# Patient Record
Sex: MALE | Race: ASIAN | NOT HISPANIC OR LATINO
[De-identification: names, ages, dates, MRNs, and addresses within clinical notes are randomized per-mention and may not be internally consistent; named-entity substitution may affect disease eponyms.]

---

## 2022-01-17 ENCOUNTER — RESULT REVIEW (OUTPATIENT)
Age: 27
End: 2022-01-17

## 2022-06-05 ENCOUNTER — EMERGENCY (EMERGENCY)
Facility: HOSPITAL | Age: 27
LOS: 1 days | Discharge: HOME | End: 2022-06-05
Attending: STUDENT IN AN ORGANIZED HEALTH CARE EDUCATION/TRAINING PROGRAM
Payer: COMMERCIAL

## 2022-06-05 VITALS
TEMPERATURE: 98 F | DIASTOLIC BLOOD PRESSURE: 106 MMHG | RESPIRATION RATE: 22 BRPM | WEIGHT: 164.02 LBS | HEIGHT: 70 IN | OXYGEN SATURATION: 100 % | HEART RATE: 59 BPM | SYSTOLIC BLOOD PRESSURE: 162 MMHG

## 2022-06-05 VITALS
HEART RATE: 57 BPM | OXYGEN SATURATION: 100 % | RESPIRATION RATE: 18 BRPM | SYSTOLIC BLOOD PRESSURE: 125 MMHG | TEMPERATURE: 98 F | DIASTOLIC BLOOD PRESSURE: 86 MMHG

## 2022-06-05 DIAGNOSIS — T14.8 OTHER INJURY OF UNSPECIFIED BODY REGION: Chronic | ICD-10-CM

## 2022-06-05 PROCEDURE — 96375 TX/PRO/DX INJ NEW DRUG ADDON: CPT

## 2022-06-05 PROCEDURE — 73020 X-RAY EXAM OF SHOULDER: CPT

## 2022-06-05 PROCEDURE — 99284 EMERGENCY DEPT VISIT MOD MDM: CPT | Mod: 25

## 2022-06-05 PROCEDURE — 73030 X-RAY EXAM OF SHOULDER: CPT

## 2022-06-05 PROCEDURE — 99284 EMERGENCY DEPT VISIT MOD MDM: CPT | Mod: 57

## 2022-06-05 PROCEDURE — 23650 CLTX SHO DSLC W/MNPJ WO ANES: CPT | Mod: 54

## 2022-06-05 PROCEDURE — 73030 X-RAY EXAM OF SHOULDER: CPT | Mod: 26,RT

## 2022-06-05 PROCEDURE — 96374 THER/PROPH/DIAG INJ IV PUSH: CPT

## 2022-06-05 PROCEDURE — 23650 CLTX SHO DSLC W/MNPJ WO ANES: CPT | Mod: RT

## 2022-06-05 RX ORDER — KETOROLAC TROMETHAMINE 30 MG/ML
30 SYRINGE (ML) INJECTION ONCE
Refills: 0 | Status: DISCONTINUED | OUTPATIENT
Start: 2022-06-05 | End: 2022-06-05

## 2022-06-05 RX ORDER — MORPHINE SULFATE 50 MG/1
4 CAPSULE, EXTENDED RELEASE ORAL ONCE
Refills: 0 | Status: DISCONTINUED | OUTPATIENT
Start: 2022-06-05 | End: 2022-06-05

## 2022-06-05 RX ADMIN — Medication 30 MILLIGRAM(S): at 18:17

## 2022-06-05 RX ADMIN — MORPHINE SULFATE 4 MILLIGRAM(S): 50 CAPSULE, EXTENDED RELEASE ORAL at 18:19

## 2022-06-05 NOTE — ED PROVIDER NOTE - ATTENDING APP SHARED VISIT CONTRIBUTION OF CARE
27 M w/ no pmh here w/ R shoulder pain s/p lifting weights felt a pop, no fall no injury, pt w/ no numbness in the arm, no cp, no sob. no hand/wrist weakness. pt s/p reduction w/ no numbness along medial/radial/ulnar/axillary nerve, intact hand/wrist/elbow flexion/extension, pt w/ no tenderness in the shoulder. pt R hand dominant. Recommending outpt sports mediicne follow up nonweightbearing int eh Right upper extremity 2+ radial pulse bialterally,

## 2022-06-05 NOTE — ED PROVIDER NOTE - PATIENT PORTAL LINK FT
You can access the FollowMyHealth Patient Portal offered by [facility] by registering at the following website: [email or web address]. By joining [facility]’s FollowMyHealth portal, you will also be able to view your health information using other applications (apps) compatible with our system.

## 2022-06-05 NOTE — ED PROVIDER NOTE - NSFOLLOWUPINSTRUCTIONS_ED_ALL_ED_FT
Please see the information of shoulder dislocation.    Ice to pain area for 2days; every 2hours for 20minutes.    Keep the shoulder immobilizer.    Take Ibuprofen or Tylenol for pain as package directed.    Follow up with orthopedist Dr. Jones in this week, call tomorrow for appointment.    Return for any concerns, numbness, weakness, or worsening pain.

## 2022-06-05 NOTE — ED PROVIDER NOTE - CARE PROVIDER_API CALL
Quinton Jones (MD)  Orthopaedic Surgery  [address]  [place] [address]  Phone: [phone]  Fax: [phone]  Follow Up Time:

## 2022-06-05 NOTE — ED ADULT NURSE NOTE - LOCATION
[General Appearance - Alert] : alert [Sclera] : the sclera and conjunctiva were normal [General Appearance - In No Acute Distress] : in no acute distress [Extraocular Movements] : extraocular movements were intact [PERRL With Normal Accommodation] : pupils were equal in size, round, reactive to light [Outer Ear] : the ears and nose were normal in appearance [Oropharynx] : the oropharynx was normal with no thrush [Neck Appearance] : the appearance of the neck was normal [Jugular Venous Distention Increased] : there was no jugular-venous distention [Neck Cervical Mass (___cm)] : no neck mass was observed [Thyroid Diffuse Enlargement] : the thyroid was not enlarged [Auscultation Breath Sounds / Voice Sounds] : lungs were clear to auscultation bilaterally [Heart Rate And Rhythm] : heart rate was normal and rhythm regular [Heart Sounds] : normal S1 and S2 [Heart Sounds Gallop] : no gallops [Murmurs] : no murmurs [Heart Sounds Pericardial Friction Rub] : no pericardial rub [Full Pulse] : the pedal pulses are present [Edema] : there was no peripheral edema [Bowel Sounds] : normal bowel sounds [Abdomen Soft] : soft [Abdomen Tenderness] : non-tender [Abdomen Mass (___ Cm)] : no abdominal mass palpated [Costovertebral Angle Tenderness] : no CVA tenderness [Penis Abnormality] : the penis was normal [Scrotum] : the scrotum was normal [Testes Swelling] : the testicles were not swollen [Testes Mass (___cm)] : there were no testicular masses [No Palpable Adenopathy] : no palpable adenopathy [Nail Clubbing] : no clubbing  or cyanosis of the fingernails [Musculoskeletal - Swelling] : no joint swelling [Skin Color & Pigmentation] : normal skin color and pigmentation [] : no rash [Motor Tone] : muscle strength and tone were normal [Sensation] : the sensory exam was normal to light touch and pinprick [Deep Tendon Reflexes (DTR)] : deep tendon reflexes were 2+ and symmetric [No Focal Deficits] : no focal deficits [Affect] : the affect was normal shoulder [Oriented To Time, Place, And Person] : oriented to person, place, and time

## 2022-06-05 NOTE — ED ADULT NURSE NOTE - OBJECTIVE STATEMENT
27M aaox4 ambulatory with no known medical history, came as walk in from the gym with c/o rt shoulder pain s/o weight lifting and heard a "pop" in his rt shoulder after lifting heavy weights.  Pain 10/10. Denies any medical hx. left IV access inserted, pain meds given as ordered.   Placed on his abdomen and a 4 L heavy weights placed on his rt arm as traction and counter traction. Rt shoulder pop back in. Patient reports improvement from pain. Pending Xray for confirmation.

## 2022-06-05 NOTE — ED PROVIDER NOTE - OBJECTIVE STATEMENT
27y M w/ no pertinent PMHx presents to the ED c/o R shoulder pain s/p while lifting weights earlier today. Pt states no previous injury to R shoulder. 27y M w/ no pertinent PMHx presents to the ED c/o R dominant shoulder pain s/p while lifting weights earlier today. Denies fall or impaction. Pt states no previous injury to R shoulder. Denies numbness or weakness to extremities. Denies other injuries. Denies fever, chills, or recent sickness.

## 2022-06-05 NOTE — ED PROVIDER NOTE - PHYSICAL EXAMINATION
NAD. Hypertensive, Afebrile, Neck supple. Lungs clear. +right shoulder; deformity, tender and unable ROMs. N/V- intact. Skin intact.

## 2022-06-07 PROBLEM — Z00.00 ENCOUNTER FOR PREVENTIVE HEALTH EXAMINATION: Status: ACTIVE | Noted: 2022-06-07

## 2022-06-28 ENCOUNTER — APPOINTMENT (OUTPATIENT)
Dept: ORTHOPEDIC SURGERY | Facility: CLINIC | Age: 27
End: 2022-06-28
Payer: COMMERCIAL

## 2022-06-28 VITALS — HEIGHT: 70 IN | WEIGHT: 164 LBS | BODY MASS INDEX: 23.48 KG/M2

## 2022-06-28 DIAGNOSIS — S43.004A UNSPECIFIED DISLOCATION OF RIGHT SHOULDER JOINT, INITIAL ENCOUNTER: ICD-10-CM

## 2022-06-28 PROBLEM — Z78.9 OTHER SPECIFIED HEALTH STATUS: Chronic | Status: ACTIVE | Noted: 2022-06-06

## 2022-06-28 PROCEDURE — 99203 OFFICE O/P NEW LOW 30 MIN: CPT

## 2022-08-09 ENCOUNTER — APPOINTMENT (OUTPATIENT)
Dept: ORTHOPEDIC SURGERY | Facility: CLINIC | Age: 27
End: 2022-08-09

## 2024-10-03 NOTE — ED ADULT NURSE NOTE - NSIMPLEMENTINTERV_GEN_ALL_ED
Imaging Received  October 3, 2024 12:26 PM ABT   Action: Images from HP received and resolved to PACS.     DIAGNOSIS: Acute pain of left knee [M25.562]    APPOINTMENT DATE: 10/10/24   NOTES STATUS DETAILS   OFFICE NOTE from referring provider Internal 09/26/24: Jamil Leon PA-C   OFFICE NOTE from other specialist Care Everywhere 03/08/22: Rashid Dumas PA-C   DISCHARGE SUMMARY from hospital Internal 09/26/24: [facility]   MEDICATION LIST Internal    ULTRASOUND PACS PACS:  09/26/24, 05/26/23: US Lower Extrem    HP:  03/10/22: US Lower Extrem   XRAYS (IMAGES & REPORTS) PACS PACS:  09/26/24: XR Knee LT  09/26/24: XR Tib Fib LT    HP:  03/10/22: XR Knee Bilateral          Implemented All Universal Safety Interventions:  [place] to call system. Call bell, personal items and telephone within reach. Instruct patient to call for assistance. Room bathroom lighting operational. Non-slip footwear when patient is off stretcher. Physically safe environment: no spills, clutter or unnecessary equipment. Stretcher in lowest position, wheels locked, appropriate side rails in place.